# Patient Record
Sex: FEMALE | Race: OTHER | HISPANIC OR LATINO | URBAN - METROPOLITAN AREA
[De-identification: names, ages, dates, MRNs, and addresses within clinical notes are randomized per-mention and may not be internally consistent; named-entity substitution may affect disease eponyms.]

---

## 2021-03-11 ENCOUNTER — EMERGENCY (EMERGENCY)
Facility: HOSPITAL | Age: 71
LOS: 1 days | Discharge: HOME | End: 2021-03-11
Attending: STUDENT IN AN ORGANIZED HEALTH CARE EDUCATION/TRAINING PROGRAM
Payer: MEDICARE

## 2021-03-11 VITALS
HEART RATE: 74 BPM | TEMPERATURE: 98 F | DIASTOLIC BLOOD PRESSURE: 60 MMHG | OXYGEN SATURATION: 95 % | RESPIRATION RATE: 18 BRPM | SYSTOLIC BLOOD PRESSURE: 139 MMHG

## 2021-03-11 VITALS
OXYGEN SATURATION: 100 % | HEIGHT: 63 IN | SYSTOLIC BLOOD PRESSURE: 141 MMHG | WEIGHT: 181 LBS | TEMPERATURE: 98 F | HEART RATE: 70 BPM | DIASTOLIC BLOOD PRESSURE: 81 MMHG | RESPIRATION RATE: 20 BRPM

## 2021-03-11 PROCEDURE — 70450 CT HEAD/BRAIN W/O DYE: CPT | Mod: 26,MG

## 2021-03-11 PROCEDURE — 70450 CT HEAD/BRAIN W/O DYE: CPT

## 2021-03-11 PROCEDURE — 70486 CT MAXILLOFACIAL W/O DYE: CPT

## 2021-03-11 PROCEDURE — 99284 EMERGENCY DEPT VISIT MOD MDM: CPT | Mod: 25

## 2021-03-11 PROCEDURE — 72170 X-RAY EXAM OF PELVIS: CPT | Mod: 26

## 2021-03-11 PROCEDURE — 73562 X-RAY EXAM OF KNEE 3: CPT | Mod: 26,50

## 2021-03-11 PROCEDURE — 72170 X-RAY EXAM OF PELVIS: CPT

## 2021-03-11 PROCEDURE — 70486 CT MAXILLOFACIAL W/O DYE: CPT | Mod: 26,MG

## 2021-03-11 PROCEDURE — 99284 EMERGENCY DEPT VISIT MOD MDM: CPT

## 2021-03-11 PROCEDURE — 90715 TDAP VACCINE 7 YRS/> IM: CPT

## 2021-03-11 PROCEDURE — G1004: CPT

## 2021-03-11 PROCEDURE — 73562 X-RAY EXAM OF KNEE 3: CPT

## 2021-03-11 PROCEDURE — 90471 IMMUNIZATION ADMIN: CPT

## 2021-03-11 RX ORDER — ACETAMINOPHEN 500 MG
975 TABLET ORAL ONCE
Refills: 0 | Status: COMPLETED | OUTPATIENT
Start: 2021-03-11 | End: 2021-03-11

## 2021-03-11 RX ORDER — TETANUS TOXOID, REDUCED DIPHTHERIA TOXOID AND ACELLULAR PERTUSSIS VACCINE, ADSORBED 5; 2.5; 8; 8; 2.5 [IU]/.5ML; [IU]/.5ML; UG/.5ML; UG/.5ML; UG/.5ML
0.5 SUSPENSION INTRAMUSCULAR ONCE
Refills: 0 | Status: COMPLETED | OUTPATIENT
Start: 2021-03-11 | End: 2021-03-11

## 2021-03-11 RX ADMIN — TETANUS TOXOID, REDUCED DIPHTHERIA TOXOID AND ACELLULAR PERTUSSIS VACCINE, ADSORBED 0.5 MILLILITER(S): 5; 2.5; 8; 8; 2.5 SUSPENSION INTRAMUSCULAR at 19:20

## 2021-03-11 RX ADMIN — Medication 975 MILLIGRAM(S): at 19:19

## 2021-03-11 NOTE — ED PROVIDER NOTE - PATIENT PORTAL LINK FT
You can access the FollowMyHealth Patient Portal offered by [facility] by registering at the following website: [email or web address]. By joining [employer]’s FollowMyHealth portal, you will also be able to view your health information using other applications (apps) compatible with our system.

## 2021-03-11 NOTE — ED PROVIDER NOTE - PHYSICAL EXAMINATION
General: well appearing female, no acute distress   HEENT: frontal hematoma, nasal swelling   Respiratory: normal work of breathing, lungs clear to auscultation bilaterally   Cardiac: regular rate and rhythm   Abdomen: soft, non-tender, no guarding or rebound   MSK: no wrist tenderness to palpation, no snuff box tenderness, bilateral knee tenderness to palpation, full ROM  Skin: warm, dry   Neuro: A&Ox3  Psych: appropriate affect

## 2021-03-11 NOTE — ED PROVIDER NOTE - OBJECTIVE STATEMENT
70F, pmh of htn, DM, presenting after a fall. patient reports she was walking with her children when she tripped and fell. attempted to brace herself but hit her head. denies losing consciousness. no chest pain, shortness of breath or dizziness. reports headache, wrist and knee pain. was able to walk with assistance after the fall.

## 2021-03-11 NOTE — ED PROVIDER NOTE - PROGRESS NOTE DETAILS
patient and daughter updated on results. patient ambulating without assistance. will discharge. Mckinley Ya

## 2021-03-11 NOTE — ED ADULT TRIAGE NOTE - CHIEF COMPLAINT QUOTE
Negative loc, open area to bridge of nose with swelling and deformity.  Also c/o pain to bilateral knees.  C/o feeling shaky and unsteady, no blood thinners

## 2021-03-11 NOTE — ED PROVIDER NOTE - NSFOLLOWUPINSTRUCTIONS_ED_ALL_ED_FT
You were seen in the emergency department after a fall and were found to have a broken nose.     Please follow-up with your primary care doctor in the next 24-48 hours.     Please follow-up with ENT or Plastic surgery in the next two days.     If you have any worsening symptoms, severe headache, changes in vision, nausea or vomiting, please return ot the emergency department.

## 2021-03-11 NOTE — ED PROVIDER NOTE - CLINICAL SUMMARY MEDICAL DECISION MAKING FREE TEXT BOX
70F presenting after a mechanical fall. frontal hematoma and nasal swelling. no wrist or snuff box tenderness. tenderness to knees with movement but has full ROM. unknown last tetanus. will get CT head, xray knees, pain control. will reassess.

## 2021-06-25 NOTE — ED ADULT TRIAGE NOTE - BMI (KG/M2)
32.1 Health Maintenance Due   Topic Date Due   • HPV Vaccine (1 - 2-dose series) Never done   • Hepatitis A Vaccine (2 of 2 - 2-dose series) 04/17/2020   • Annual Physical (ages 3-18)  08/14/2020       Patient is due for topics as listed above but is not proceeding with Immunization(s) Hep A and HPV and Annual Wellness Visit (ages 3-18) at this time. Orders placed for Depression Screening .